# Patient Record
Sex: MALE | Race: WHITE
[De-identification: names, ages, dates, MRNs, and addresses within clinical notes are randomized per-mention and may not be internally consistent; named-entity substitution may affect disease eponyms.]

---

## 2018-05-17 ENCOUNTER — HOSPITAL ENCOUNTER (EMERGENCY)
Dept: HOSPITAL 80 - FED | Age: 68
Discharge: HOME | End: 2018-05-17
Payer: COMMERCIAL

## 2018-05-17 VITALS — DIASTOLIC BLOOD PRESSURE: 102 MMHG | SYSTOLIC BLOOD PRESSURE: 156 MMHG

## 2018-05-17 DIAGNOSIS — E86.9: ICD-10-CM
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
## 2018-05-17 DIAGNOSIS — M51.36: ICD-10-CM

## 2018-05-17 DIAGNOSIS — I10: ICD-10-CM

## 2018-05-17 DIAGNOSIS — S39.012A: Primary | ICD-10-CM

## 2018-05-17 LAB — PLATELET # BLD: 181 10^3/UL (ref 150–400)

## 2018-05-17 PROCEDURE — 96361 HYDRATE IV INFUSION ADD-ON: CPT

## 2018-05-17 PROCEDURE — 96374 THER/PROPH/DIAG INJ IV PUSH: CPT

## 2018-05-17 PROCEDURE — 99285 EMERGENCY DEPT VISIT HI MDM: CPT

## 2018-05-17 PROCEDURE — 74176 CT ABD & PELVIS W/O CONTRAST: CPT

## 2018-05-17 NOTE — EDPHY
H & P


Stated Complaint: R FLANK PAIN


Time Seen by Provider: 05/17/18 17:09


HPI/ROS: 


HPI:  This is a 68-year-old male who presents with





Chief Complaint:  Right flank pain





Location:right posterior flank 


Quality:  Sharp, constant pain


Duration:  Since around 830 this morning


Signs and Symptoms: no fever, no nausea, no vomiting, no hematemesis, no blood 

in stool, no abdominal bloating, no diarrhea, no back pain, no urinary symptoms

, no testicular/groin pain, no indigestion, no chest pain, no shortness of 

breath


Timing:  Acute, constant


Severity:  Moderate


Context:  Patient has a history of hypertension, gout presents with waking up 

this morning with right posterior flank pain that he describes as being 

moderate in intensity sharp in nature and constant.  It is nonradiating in 

nature.  Patient reports that he last urinated approximately 2-3 hours ago.  No 

history of abdominal surgeries.  Denies any fever/nausea/vomiting/diarrhea/

urinary symptoms/testicular or groin pain.  Wife has a history kidney stone and 

advised patient discharge drinking 8-10 glasses of water today.  Denies any 

history with food intolerance is or early satiety.  He has daily bowel 

movement.  Every morning he performs yoga which he did this morning and had no 

relief of the discomfort.  No recent heavy lifting or trauma to suggest lumbar 

pathology.  No history of lower back pain.


Modifying Factors:  Yoga





Comment: 








ROS: see HPI


Constitutional: No fever, no chills, no weight loss


Eyes:  No blurred vision


Respiratory:  No shortness of breath, no cough


Cardiovascular:  No chest pain, no palpitations


Gastrointestinal:  No nausea, no vomiting, no diarrhea, no hematemesis, no 

blood in stool 


Genitourinary:  No dysuria, no blood in urine 


Extremities:  No myalgias, no edema


Neurologic:  No weakness, no numbness


Skin:  No rashes, no petechiae


Hematologic:  No bruising, no bleeding





MEDICAL/SURGICAL/SOCIAL HISTORY: 


Medical history:  Hypertension, gout


Surgical history:  Denies


Social history:  Nonsmoker. Family history noncontributory.








CONSTITUTIONAL:  Extremely polite and cooperative elderly white male, white 

beard, wears glasses, awake and alert, no obvious distress


HEENT: Atraumatic and normocephalic, PERRL, EOMI.  Nares patent; no rhinorrhea;

  no nasal mucosal edema. Tympanic membranes clear. Oropharynx clear, no 

exudate and moist pink mucosa.  Airway patent.  No lymphadenopathy.  No 

meningismus.


Cardiovascular: Normal S1/S2, regular rate, regular rhythm, without murmur rub 

or gallop.


PULMONARY/CHEST:  Symmetrical and nontender. Clear to auscultation bilaterally. 

Good air movement. No accessory muscle usage.


ABDOMEN:  Soft, nondistended, nontender, no rebound, no guarding, no peritoneal 

signs, no masses or organomegaly.  Right mild CVAT.  Bowel sounds heard x4 

quadrants.


EXTREMITIES:  2/2 pulses, strength 5/5, no deformities, no clubbing, no 

cyanosis or edema.


NEUROLOGICAL: no focal neuro deficits.  GCS 15.


SKIN: Warm and dry, no erythema. no rash.  Good capillary refill. 








Source: Patient, Family (Wife)


Exam Limitations: No limitations





- Personal History


Current Tetanus/Diphtheria Vaccine: Yes





- Medical/Surgical History


Hx Asthma: No


Hx Chronic Respiratory Disease: No


Hx Diabetes: No


Hx Cardiac Disease: No


Hx Renal Disease: No


Hx Cirrhosis: No


Hx Alcoholism: No


Hx HIV/AIDS: No


Hx Splenectomy or Spleen Trauma: No


Other PMH: HTN/GOUT





- Social History


Smoking Status: Never smoked


Constitutional: 


 Initial Vital Signs











Temperature (C)  36.5 C   05/17/18 17:02


 


Heart Rate  70   05/17/18 17:02


 


Respiratory Rate  18   05/17/18 17:02


 


Blood Pressure  181/102 H  05/17/18 17:02


 


O2 Sat (%)  97   05/17/18 17:02








 











O2 Delivery Mode               Room Air














Allergies/Adverse Reactions: 


 





allopurinol Allergy (Unknown, Verified 05/17/18 17:00)


 








Home Medications: 














 Medication  Instructions  Recorded


 


Colchicine [Colchicine (RX)] 0.6 mg PO BID 01/21/13


 


Lisinopril [Zestril 40 mg (RX)] 40 mg PO DAILY 01/21/13


 


Indomethacin Unknown Strength PRN 02/20/13


 


Cyclobenzaprine [Flexeril 10 MG 10 mg PO TID PRN #15 tab 05/17/18





(*)]  


 


Lidocaine [Lidoderm] 1 each TP Q12 PRN #6 adh..patch 05/17/18


 


ULORIC  05/17/18














Medical Decision Making





- Diagnostics


Imaging Results: 


 Imaging Impressions





Abdomen/Pelvis CT  05/17/18 17:15


Impression:  Negative non-contrast CT examination of the urinary system.


 


Atherosclerosis.


 


Lower lumbar degenerative disk disease.


 


Results called to Penny Su PA-C, at 18:00.


 


Attention:  This CT examination is specifically designed to evaluate patients 

who are clinically suspected of having acute obstructive uropathy.  This 

examination does not use radiographic contrast, and as such, provides only a  

limited evaluation of the abdomen, pelvis and retroperitoneum.  If there is 

further clinical suspicion for pathological conditions other than obstructive 

uropathy, a complete CT evaluation of the abdomen and pelvis utilizing 

intravenous, oral, and rectal contrast should be considered.


 











ED Course/Re-evaluation: 


Labs, urinalysis, IV fluids, CT abdomen and pelvis scan without contrast to 

evaluate for right ureterolithiasis. 


Given 1 L normal saline and IV Toradol 30 mg


Reviewed vital signs upon arrival in show elevated blood pressure.  Will re-

evaluate prior to discharge suspect related to pain. 


1759:  Called by radiologist who advised that CT abdomen and pelvis scan shows 

no signs of ureterolithiasis, hydronephrosis, appendicitis, obstruction. 


Labs reviewed.  No signs of leukocytosis/anemia/MIGUE.  Sodium noted to be 132. + 

macrocytosis 


1808:  Reassessed patient who reports that he did change tires on his wife's 

yesterday.  Discussed that CT abdomen and pelvis scan shows multilevel lumbar 

degenerative disc disease as well as some stenosis.  Lidoderm patch placed and 

given p.o. Valium 5 mg. 


Will give patient a prescription for Lidoderm patches and p.o. Flexeril. 


Advised to follow up with primary care if symptoms persist greater than 7-10 

days at which time an MRI lumbar spine can be obtained outpatient.


No signs of neurovascular compromise/tenting of skin/compartment syndrome/

extremities and joints examined above and below area of concern and are 

neurovascularly intact.


No neurological signs to suggest emergent MRI required in the emergency room.  











This patient was seen under the supervision of my secondary supervising 

physician.  I evaluated care for this patient independently.  Discussed this 

patient with Dr. Hernandez who did not see the patient.  





Differential Diagnosis: 


Flank pain including but not limited to musculoskeletal causes, kidney stone, 

pyelonephritis, shingles, and intra-abdominal causes such as diverticulitis and 

appendicitis.








- Data Points


Laboratory Results: 


 Laboratory Results





 05/17/18 17:20 





 05/17/18 17:20 





 











  05/17/18 05/17/18





  17:20 17:20


 


WBC    6.97 10^3/uL 10^3/uL





    (3.80-9.50) 


 


RBC    4.14 10^6/uL L 10^6/uL





    (4.40-6.38) 


 


Hgb    14.1 g/dL g/dL





    (13.7-17.5) 


 


Hct    41.5 % %





    (40.0-51.0) 


 


MCV    100.2 fL H fL





    (81.5-99.8) 


 


MCH    34.1 pg pg





    (27.9-34.1) 


 


MCHC    34.0 g/dL g/dL





    (32.4-36.7) 


 


RDW    13.2 % %





    (11.5-15.2) 


 


Plt Count    181 10^3/uL 10^3/uL





    (150-400) 


 


MPV    10.9 fL fL





    (8.7-11.7) 


 


Neut % (Auto)    74.7 % H %





    (39.3-74.2) 


 


Lymph % (Auto)    14.9 % L %





    (15.0-45.0) 


 


Mono % (Auto)    8.8 % %





    (4.5-13.0) 


 


Eos % (Auto)    1.0 % %





    (0.6-7.6) 


 


Baso % (Auto)    0.3 % %





    (0.3-1.7) 


 


Nucleat RBC Rel Count    0.0 % %





    (0.0-0.2) 


 


Absolute Neuts (auto)    5.21 10^3/uL 10^3/uL





    (1.70-6.50) 


 


Absolute Lymphs (auto)    1.04 10^3/uL 10^3/uL





    (1.00-3.00) 


 


Absolute Monos (auto)    0.61 10^3/uL 10^3/uL





    (0.30-0.80) 


 


Absolute Eos (auto)    0.07 10^3/uL 10^3/uL





    (0.03-0.40) 


 


Absolute Basos (auto)    0.02 10^3/uL 10^3/uL





    (0.02-0.10) 


 


Absolute Nucleated RBC    0.00 10^3/uL 10^3/uL





    (0-0.01) 


 


Immature Gran %    0.3 % %





    (0.0-1.1) 


 


Immature Gran #    0.02 10^3/uL 10^3/uL





    (0.00-0.10) 


 


Sodium  132 mEq/L L mEq/L  





   (135-145)  


 


Potassium  4.6 mEq/L mEq/L  





   (3.3-5.0)  


 


Chloride  97 mEq/L mEq/L  





   ()  


 


Carbon Dioxide  22 mEq/l mEq/l  





   (22-31)  


 


Anion Gap  13 mEq/L mEq/L  





   (8-16)  


 


BUN  22 mg/dL mg/dL  





   (7-23)  


 


Creatinine  1.0 mg/dL mg/dL  





   (0.7-1.3)  


 


Estimated GFR  > 60   





   


 


Glucose  90 mg/dL mg/dL  





   ()  


 


Calcium  9.0 mg/dL mg/dL  





   (8.5-10.4)  











Medications Given: 


 








Discontinued Medications





Sodium Chloride (Ns)  1,000 mls @ 0 mls/hr IV ONCE ONE; Wide Open


   PRN Reason: Protocol


   Stop: 05/17/18 17:15


   Last Admin: 05/17/18 17:23 Dose:  1,000 mls


Ketorolac Tromethamine (Toradol)  30 mg IVP EDNOW ONE


   Stop: 05/17/18 17:15


   Last Admin: 05/17/18 17:22 Dose:  30 mg








Departure





- Departure


Disposition: Home, Routine, Self-Care


Clinical Impression: 


 Lumbar degenerative disc disease





Strain of lumbar paraspinal muscle


Qualifiers:


 Encounter type: initial encounter Qualified Code(s): S39.012A - Strain of 

muscle, fascia and tendon of lower back, initial encounter





Condition: Good


Instructions:  Degenerative Disc Disease (ED), Low Back Strain (ED)


Additional Instructions: 


No lifting greater than 10 lb until all symptoms have resolved. 


Take Tylenol 650 mg every 4 hours and/or Ibuprofen 600 mg every 8 hours with 

food as needed for pain. 


Use Flexeril every 8 hr as needed for muscle spasm.


Apply Lidoderm patch every 12 hr as needed for lumbar paraspinous muscle pain. 


If Symptoms persist greater than 7-10 days, follow-up with primary care 

provider to obtain an lumbar MRI outpatient. 











Return to the ER immediately if you have new or worsening back pain, fevers/

chills, flu like symptoms, incontinence or inability to urinate or defecate, 

weakness, paralysis, or any other symptom that concerns you


Referrals: 


Regina Pantoja [Primary Care Provider] - As per Instructions


Prescriptions: 


Cyclobenzaprine [Flexeril 10 MG (*)] 10 mg PO TID PRN #15 tab


 PRN Reason: Spasms


Lidocaine [Lidoderm] 1 each TP Q12 PRN #6 adh..patch


 PRN Reason: Pain, Moderate

## 2019-06-17 ENCOUNTER — HOSPITAL ENCOUNTER (OUTPATIENT)
Dept: HOSPITAL 80 - FCATH | Age: 69
Discharge: HOME | End: 2019-06-17
Payer: COMMERCIAL